# Patient Record
Sex: FEMALE | Race: WHITE | NOT HISPANIC OR LATINO | Employment: FULL TIME | URBAN - METROPOLITAN AREA
[De-identification: names, ages, dates, MRNs, and addresses within clinical notes are randomized per-mention and may not be internally consistent; named-entity substitution may affect disease eponyms.]

---

## 2022-12-14 ENCOUNTER — PROCEDURE VISIT (OUTPATIENT)
Dept: NEUROLOGY | Facility: CLINIC | Age: 52
End: 2022-12-14

## 2022-12-14 VITALS — DIASTOLIC BLOOD PRESSURE: 78 MMHG | OXYGEN SATURATION: 97 % | SYSTOLIC BLOOD PRESSURE: 116 MMHG | HEART RATE: 89 BPM

## 2022-12-14 DIAGNOSIS — G43.719 INTRACTABLE CHRONIC MIGRAINE WITHOUT AURA AND WITHOUT STATUS MIGRAINOSUS: Primary | ICD-10-CM

## 2022-12-14 RX ORDER — TOPIRAMATE 50 MG/1
50 TABLET, FILM COATED ORAL 2 TIMES DAILY
COMMUNITY

## 2022-12-14 RX ORDER — QUETIAPINE FUMARATE 100 MG/1
100 TABLET, FILM COATED ORAL NIGHTLY
COMMUNITY

## 2022-12-14 RX ORDER — TRAMADOL HYDROCHLORIDE 50 MG/1
50 TABLET ORAL EVERY 8 HOURS PRN
COMMUNITY

## 2022-12-14 RX ORDER — RIZATRIPTAN BENZOATE 10 MG/1
10 TABLET ORAL
COMMUNITY
End: 2022-12-14 | Stop reason: SDUPTHER

## 2022-12-14 RX ORDER — ZOLPIDEM TARTRATE 10 MG/1
10 TABLET ORAL
COMMUNITY

## 2022-12-14 RX ORDER — RIZATRIPTAN BENZOATE 10 MG/1
10 TABLET ORAL AS NEEDED
Qty: 9 TABLET | Refills: 3 | Status: SHIPPED | OUTPATIENT
Start: 2022-12-14 | End: 2023-01-13

## 2022-12-14 RX ORDER — DIAZEPAM 2 MG/1
2 TABLET ORAL EVERY 8 HOURS
COMMUNITY

## 2022-12-14 RX ORDER — TEMAZEPAM 30 MG/1
30 CAPSULE ORAL NIGHTLY PRN
COMMUNITY

## 2022-12-14 NOTE — PROGRESS NOTES
Procedure   Procedures      Patient returns for Botox injections for chronic migraine.  The risks and benefits of the injection procedure and the toxin were discussed with her in detail and she expressed understanding and agreement to proceed.    Description: Using a 30-gauge needle, a total of 165 units were injected.  The first 155 units were injected over 31 sites according to the chronic migraine injection paradigm.  This included symmetric injections into trapezius, cervical paraspinals, occipitalis, temporalis, procerus, , and frontalis.  Additionally, 5 units were injected into each upper occipitalis.  There was trace blood loss but no immediate complications.  The patient tolerated the procedure well.  There were 35 units of unavoidable waste.                    Botox B&B

## 2023-03-03 ENCOUNTER — TELEPHONE (OUTPATIENT)
Dept: NEUROLOGY | Facility: CLINIC | Age: 53
End: 2023-03-03
Payer: COMMERCIAL

## 2023-03-03 NOTE — TELEPHONE ENCOUNTER
Provider: ASTER PHELPS MD    Caller: April    Relationship to Patient: SELF    Phone Number: [phone]    Reason for Call: PT CALLING TO SEE IF SHE CAN R/S HER BOTOX ON 3-15-23 TO 3-16-23.    PLEASE CALL & ADVISE

## 2023-03-16 ENCOUNTER — PROCEDURE VISIT (OUTPATIENT)
Dept: NEUROLOGY | Facility: CLINIC | Age: 53
End: 2023-03-16
Payer: COMMERCIAL

## 2023-03-16 DIAGNOSIS — G43.719 INTRACTABLE CHRONIC MIGRAINE WITHOUT AURA AND WITHOUT STATUS MIGRAINOSUS: Primary | ICD-10-CM

## 2023-03-16 PROCEDURE — 64615 CHEMODENERV MUSC MIGRAINE: CPT | Performed by: PSYCHIATRY & NEUROLOGY

## 2023-03-16 RX ORDER — DIVALPROEX SODIUM 125 MG/1
1 TABLET, DELAYED RELEASE ORAL EVERY 12 HOURS SCHEDULED
COMMUNITY
Start: 2023-03-13

## 2023-09-22 ENCOUNTER — SPECIALTY PHARMACY (OUTPATIENT)
Dept: INFUSION THERAPY | Facility: HOSPITAL | Age: 53
End: 2023-09-22

## 2023-09-25 RX ORDER — ONABOTULINUMTOXINA 200 [USP'U]/1
165 INJECTION, POWDER, LYOPHILIZED, FOR SOLUTION INTRADERMAL; INTRAMUSCULAR
Qty: 1 EACH | Refills: 2 | Status: SHIPPED | OUTPATIENT
Start: 2023-09-25 | End: 2023-12-18 | Stop reason: SDUPTHER

## 2023-09-27 ENCOUNTER — SPECIALTY PHARMACY (OUTPATIENT)
Dept: NEUROLOGY | Facility: CLINIC | Age: 53
End: 2023-09-27
Payer: COMMERCIAL

## 2023-09-27 ENCOUNTER — PROCEDURE VISIT (OUTPATIENT)
Dept: NEUROLOGY | Facility: CLINIC | Age: 53
End: 2023-09-27
Payer: COMMERCIAL

## 2023-09-27 VITALS
SYSTOLIC BLOOD PRESSURE: 110 MMHG | HEIGHT: 62 IN | DIASTOLIC BLOOD PRESSURE: 70 MMHG | HEART RATE: 67 BPM | OXYGEN SATURATION: 97 %

## 2023-09-27 DIAGNOSIS — G43.719 INTRACTABLE CHRONIC MIGRAINE WITHOUT AURA AND WITHOUT STATUS MIGRAINOSUS: Primary | ICD-10-CM

## 2023-09-27 NOTE — PROGRESS NOTES
Procedure   Procedures       Patient returns for Botox injections for chronic migraine.  The risks and benefits of the injection procedure and the toxin were discussed with her in detail and she expressed understanding and agreement to proceed.     Description: Using a 30-gauge needle, a total of 165 units were injected.  The first 155 units were injected over 31 sites according to the chronic migraine injection paradigm.  This included symmetric injections into trapezius, cervical paraspinals, occipitalis, temporalis, procerus, , and frontalis.  Additionally, 5 units were injected into each upper occipitalis.  There was trace blood loss but no immediate complications.  The patient tolerated the procedure well.  There were 35 units of unavoidable waste.     Per her request, we are changing her Nurtec over to Ubrelvy for acute therapy, 50 mg as needed.  Nurtec appears to have lost its effectiveness for her.              Botox supplied by [facility]- Do not bill

## 2023-09-27 NOTE — PROGRESS NOTES
Specialty Pharmacy Patient Management Program  Neurology Initial Assessment     April Harmon is a 52 y.o. female with chronic migraine seen by a Neurology provider and enrolled in the Neurology Patient Management program offered by [facility].  An initial outreach was conducted, including assessment of therapy appropriateness and specialty medication education for Botox. The patient was introduced to services offered by [facility], including: regular assessments, refill coordination, curbside pick-up or mail order delivery options, prior authorization maintenance, and financial assistance programs as applicable. The patient was also provided with contact information for the pharmacy team.     Insurance Coverage & Financial Support  Rx [employer] and savings card.    Relevant Past Medical History and Comorbidities  Relevant medical history and concomitant health conditions were discussed with the patient. The patient's chart has been reviewed for relevant past medical history and comorbid health conditions and updated as necessary.   No past medical history on file.  Social History     Socioeconomic History    Marital status: Single     Problem list reviewed by Real Blackmon RPH on 9/27/2023 at 11:54 AM    Allergies  Known allergies and reactions were discussed with the patient. The patient's chart has been reviewed for  allergy information and updated as necessary.   Allergies   Allergen Reactions    Sulfa Antibiotics Hives and Rash     Allergies reviewed by Real Blackmon RPH on 9/27/2023 at 11:54 AM        Current Medication List  This medication list has been reviewed with the patient and evaluated for any interactions or necessary modifications/recommendations, and updated to include all prescription medications, OTC medications, and supplements the patient is currently taking.  This list reflects what is contained in the patient's profile,  which has also been marked as reviewed to communicate to other providers it is the most up to date version of the patient's current medication therapy.     Current Outpatient Medications:     diazePAM (VALIUM) 2 MG tablet, Take 1 tablet by mouth Every 8 (Eight) Hours., Disp: , Rfl:     divalproex (DEPAKOTE) 125 MG DR tablet, Take 1 tablet by mouth Every 12 (Twelve) Hours., Disp: , Rfl:     OnabotulinumtoxinA (Botox) 200 units reconstituted solution, Inject 165 Units into the appropriate muscle as directed by prescriber Every 3 (Three) Months., Disp: 1 each, Rfl: 2    QUEtiapine (SEROquel) 100 MG tablet, Take 1 tablet by mouth Every Night., Disp: , Rfl:     temazepam (RESTORIL) 30 MG capsule, Take 1 capsule by mouth At Night As Needed for Sleep., Disp: , Rfl:     topiramate (TOPAMAX) 50 MG tablet, Take 1 tablet by mouth 2 (Two) Times a Day., Disp: , Rfl:     traMADol (ULTRAM) 50 MG tablet, Take 1 tablet by mouth Every 8 (Eight) Hours As Needed., Disp: , Rfl:     ubrogepant (UBRELVY) 50 MG tablet, Take 1 tablet by mouth 1 (One) Time As Needed (Migraine). May repeat 1 tablet after 2 hours if needed. Max of 2 tablets in 24 hours., Disp: 10 tablet, Rfl: 11  No current facility-administered medications for this visit.    Medicines reviewed by Real Blackmon RP on 9/27/2023 at 11:54 AM    Drug Interactions  None identified.     Initial Education Provided for Specialty Medication  The patient has been provided with the following education and any applicable administration techniques (i.e. self-injection) have been demonstrated for the therapies indicated. All questions and concerns have been addressed prior to the patient receiving the medication, and the patient has verbalized understanding of the education and any materials provided.  Additional patient education shall be provided and documented upon request by the patient, provider or payer.          Botox (onabotulinumtoxinA) 165 units given in 33 equally divided  doses by neurologist intramuscular in clinic office.      Medication Expectations   Why am I taking this medication? For prevention and relief of migraines.   What should I expect while on this medication? You should expect to see a decrease in the severity and frequency of migraines..     How does the medication work? Botox enters the nerve endings around where it is injected and blocks the release of chemicals involved in pain transmission.  This prevents activation of pain networks in the brain.   How long will I be on this medication for? The amount of time you will be on this medication will be determined by your doctor based your response.    How do I take this medication? This medication will be given in the office.  It will be given IM into each of 31 sites divided across 7 specific head/neck muscle areas.     What are some possible side effects? Potential side effects including, but not limited to: neck pain and stiffness, neck weakness,  temporary drooping of the eye, rare flu-like symptoms (such as muscle aches and fever), dry eyes, injections site pain, bleeding, infection, failure of the procedure to help.  More serious side effect such as allergic reaction, dysphagia, respiratory distress, .  Discussed that it may take 10-14 days after first treatment to see improvement of headaches, and that the in-office treatments are done every 12 weeks.  I gave the patient my name and contact information for any additional questions or concerns.       What happens if I miss a dose? N/A                Medication Safety   What are things I should warn my doctor immediately about? Let the provider know immediately if you have difficulty breathing or swallowing, weakness of neck muscles.   What are things that I should be cautious of?  Injections near the eye: This medicine may reduce blinking, which can raise the risk of eye problems, including corneal exposure and ulcers. Tell your doctor right away if you notice  that you are blinking less than usual or your eyes feel dry   What are some medications that can interact with this one? N/A          Medication Storage/Handling   How should I handle this medication? N/A   How does this medication need to be stored? N/A   How should I dispose of this medication? N/A          Resources/Support   How can I remind myself to take this medication? Patient will be scheduled every 3 months in clinic.   Is financial support available?  [employer] Botox savings program.   Which vaccines are recommended for me? Talk to your doctor about these vaccines: Flu, Coronavirus (COVID-19), Pneumococcal (pneumonia), Tdap, Hepatitis B, Zoster (shingles)               Adherence and Self-Administration  Adherence related to the patient's specialty therapy was discussed with the patient. The Adherence segment of this outreach has been reviewed and updated.   Is there a concern with patient's ability to self administer the medication correctly and without issue?: No  Were any potential barriers to adherence identified during the initial assessment or patient education?: No  Are there any concerns regarding the patient's understanding of the importance of medication adherence?: No  Methods for Supporting Patient Adherence and/or Self-Administration: None needed at this time.    Goals of Therapy  Goals related to the patient's specialty therapy were discussed with the patient. The Patient Goals segment of this outreach has been reviewed and updated.   Goals Addressed Today        Specialty Pharmacy General Goal      Reduce/maintain reduced frequency and severity of migraines. Patient continuing Botox as of 9/27/23 and reports occasional breakthrough migraines about ~2-3 times per month with severity of ~7/10 on a pain scale from 0-10. But this is highly variable based on weather and other external factors.                Reassessment Plan & Follow-Up  Medication Therapy Changes: continuing Botox for prevention  and starting ubrogepant (Ubrelvy) as needed for acute treatment of migraines.   Related Plans, Therapy Recommendations, or Therapy Problems to Be Addressed: Ubrogepant being filled with external pharmacy due to restrictions with patient's insurance.  Pharmacist to perform regular reassessments no more than (6) months from the previous assessment.  Care Coordinator to set up future refill outreaches, coordinate prescription delivery, and escalate clinical questions to pharmacist.   Welcome information and patient satisfaction survey to be sent by specialty pharmacy team with patient's initial fill.    Attestation  Therapeutic appropriateness: Appropriate   I attest the patient was actively involved in and has agreed to the above plan of care. If the prescribed therapy is at any point deemed not appropriate based on the current or future assessments, a consultation will be initiated with the patient's specialty care provider to determine the best course of action. The revised plan of therapy will be documented along with any additional patient education provided. Discussed aforementioned material with patient via telemedicine.    Real Blackmon, PharmD, [facility]  Clinic Specialty Pharmacist, Neurology  9/27/2023  11:57 EDT

## 2023-10-26 ENCOUNTER — TELEPHONE (OUTPATIENT)
Dept: NEUROLOGY | Facility: CLINIC | Age: 53
End: 2023-10-26

## 2023-10-26 NOTE — TELEPHONE ENCOUNTER
Caller: Tyron April    Relationship: Self    Best call back number: [phone]    Which medication are you concerned about: UBRELVY 100 MG    Who prescribed you this medication: MATTIE    When did you start taking this medication: 09/27/23    What are your concerns: PATIENT STATES THE UBRELVY IS NOT HELPING HER MIGRAINE. PATIENT IS REQUEST AN ALTERNATIVE. PLEASE CONTACT PATIENT.    How long have you had these concerns: 09/27/23        PLEASE REVIEW    THANK YOU

## 2023-11-06 ENCOUNTER — SPECIALTY PHARMACY (OUTPATIENT)
Dept: INFUSION THERAPY | Facility: HOSPITAL | Age: 53
End: 2023-11-06
Payer: COMMERCIAL

## 2023-11-06 NOTE — PROGRESS NOTES
Per Dr. Ureña, please increase Ubrelvy dose to 100mg as needed. I entered a new order for Ubrelvy 100mg #16 with 5 refills and [facility].   Ki Flores, [facility]  11/06/23  09:04 EST

## 2023-12-11 ENCOUNTER — TELEPHONE (OUTPATIENT)
Dept: NEUROLOGY | Facility: CLINIC | Age: 53
End: 2023-12-11

## 2023-12-11 NOTE — TELEPHONE ENCOUNTER
Caller: Ackerman, April    Relationship: Self    Best call back number: [phone]    What was the call regarding: PT IS WONDERING IF DR. PHELPS HAS ANY SOONER BOTOX APPTS. PT'S LAST BOTOX APPT WAS COMPLETED ON 9/27/23; 12 WEEKS WOULD BE 12/20/23, HOWEVER, NEXT BOTOX APPT IS SCHEDULED FOR 1/10/24.    Do you require a callback: YES, PLEASE.    PLEASE REVIEW AND ADVISE.

## 2023-12-18 ENCOUNTER — SPECIALTY PHARMACY (OUTPATIENT)
Dept: INFUSION THERAPY | Facility: HOSPITAL | Age: 53
End: 2023-12-18
Payer: COMMERCIAL

## 2023-12-18 RX ORDER — ONABOTULINUMTOXINA 200 [USP'U]/1
155 INJECTION, POWDER, LYOPHILIZED, FOR SOLUTION INTRADERMAL; INTRAMUSCULAR
Qty: 1 EACH | Refills: 2 | Status: SHIPPED | OUTPATIENT
Start: 2023-12-18

## 2023-12-18 NOTE — PROGRESS NOTES
Specialty Pharmacy Refill Coordination Note     Confirmed patient's botox appt for 12/20/23- went to order medication and patient's insurance only allows 1 outside fill, which was used on initial fill and now fills must go through John Kinney  Specialty Pharmacy Technician

## 2023-12-20 ENCOUNTER — SPECIALTY PHARMACY (OUTPATIENT)
Dept: INFUSION THERAPY | Facility: HOSPITAL | Age: 53
End: 2023-12-20
Payer: COMMERCIAL

## 2023-12-20 ENCOUNTER — PROCEDURE VISIT (OUTPATIENT)
Dept: NEUROLOGY | Facility: CLINIC | Age: 53
End: 2023-12-20
Payer: COMMERCIAL

## 2023-12-20 VITALS — HEART RATE: 60 BPM | DIASTOLIC BLOOD PRESSURE: 76 MMHG | OXYGEN SATURATION: 98 % | SYSTOLIC BLOOD PRESSURE: 124 MMHG

## 2023-12-20 DIAGNOSIS — G43.719 INTRACTABLE CHRONIC MIGRAINE WITHOUT AURA AND WITHOUT STATUS MIGRAINOSUS: Primary | ICD-10-CM

## 2023-12-20 RX ORDER — ZAVEGEPANT 10 MG/.1ML
10 SPRAY NASAL ONCE AS NEEDED
Qty: 6 EACH | Refills: 5 | Status: SHIPPED | OUTPATIENT
Start: 2023-12-20

## 2023-12-20 NOTE — PROGRESS NOTES
Specialty Pharmacy Refill Coordination Note     Ubrelvy 100mg PA renewal- submitted 12/20/23 key [id number]      Caro Kinney  Specialty Pharmacy Technician

## 2023-12-20 NOTE — PROGRESS NOTES
Specialty Pharmacy Refill Coordination Note     BOTOX INJ 200UNIT is approved through 03/20/2024     Caro Kinney  Specialty Pharmacy Technician

## 2023-12-20 NOTE — PROGRESS NOTES
Specialty Pharmacy Refill Coordination Note     Botox PA rnewal submitted 12/20/23 key [id number]     Current exp 12/25- last appt was today     Caro Kniney  Specialty Pharmacy Technician

## 2023-12-20 NOTE — PROGRESS NOTES
Specialty Pharmacy Patient Management Program  Neurology Initial Assessment     April Harmon is a 53 y.o. female with migraines seen by a Neurology provider and enrolled in the Neurology Patient Management program offered by [facility].  An initial outreach was conducted, including assessment of therapy appropriateness and specialty medication education for Zavzpret 10mg. The patient was introduced to services offered by [facility], including: regular assessments, refill coordination, curbside pick-up or mail order delivery options, prior authorization maintenance, and financial assistance programs as applicable. The patient was also provided with contact information for the pharmacy team.     Insurance Coverage & Financial Support  [employer]    Relevant Past Medical History and Comorbidities  Relevant medical history and concomitant health conditions were discussed with the patient. The patient's chart has been reviewed for relevant past medical history and comorbid health conditions and updated as necessary.   No past medical history on file.  Social History     Socioeconomic History    Marital status: Single     Problem list reviewed by Ki Flores RPH on 12/20/2023 at 10:21 AM    Allergies  Known allergies and reactions were discussed with the patient. The patient's chart has been reviewed for  allergy information and updated as necessary.   Allergies   Allergen Reactions    Sulfa Antibiotics Hives and Rash     Allergies reviewed by Ki Flores RPH on 12/20/2023 at 10:21 AM    Relevant Laboratory Values      Lab Assessment  There are no laboratory values in Epic to assess. Patient was encouraged to have labs drawn at least once per year.     Current Medication List  This medication list has been reviewed with the patient and evaluated for any interactions or necessary modifications/recommendations, and updated to include all  prescription medications, OTC medications, and supplements the patient is currently taking.  This list reflects what is contained in the patient's profile, which has also been marked as reviewed to communicate to other providers it is the most up to date version of the patient's current medication therapy.     Current Outpatient Medications:     diazePAM (VALIUM) 2 MG tablet, Take 1 tablet by mouth Every 8 (Eight) Hours., Disp: , Rfl:     divalproex (DEPAKOTE) 125 MG DR tablet, Take 1 tablet by mouth Every 12 (Twelve) Hours., Disp: , Rfl:     OnabotulinumtoxinA (Botox) 200 units reconstituted solution, Inject 155 Units into the appropriate muscle as directed by prescriber Every 3 (Three) Months., Disp: 1 each, Rfl: 2    QUEtiapine (SEROquel) 100 MG tablet, Take 1 tablet by mouth Every Night., Disp: , Rfl:     temazepam (RESTORIL) 30 MG capsule, Take 1 capsule by mouth At Night As Needed for Sleep., Disp: , Rfl:     topiramate (TOPAMAX) 50 MG tablet, Take 1 tablet by mouth 2 (Two) Times a Day., Disp: , Rfl:     traMADol (ULTRAM) 50 MG tablet, Take 1 tablet by mouth Every 8 (Eight) Hours As Needed., Disp: , Rfl:     Zavegepant HCl (Zavzpret) 10 MG/ACT solution, 10 mg into the nostril(s) as directed by provider 1 (One) Time As Needed (migraine). Do not exceed one dose every 24 hours., Disp: 6 each, Rfl: 5  No current facility-administered medications for this visit.    Medicines reviewed by Ki Flores [facility] on 12/20/2023 at 10:21 AM  Medicines reviewed by Ki Flores RP on 12/20/2023 at 10:21 AM    Drug Interactions  The patient's medication list has been reviewed and updated. There are no significant interactions to assess. Patient was counseled to avoid using another nasal spray within an hour of administering Zavzpret.      Initial Education Provided for Specialty Medication  The patient has been provided with the following education and any applicable administration techniques  (i.e. self-injection) have been demonstrated for the therapies indicated. All questions and concerns have been addressed prior to the patient receiving the medication, and the patient has verbalized understanding of the education and any materials provided.  Additional patient education shall be provided and documented upon request by the patient, provider or payer.      Zavzpret (zavegepant)  Medication Expectations   Why am I taking this medication? You are taking this medication to treat an acute migraine.   What should I expect while on this medication? You should expect to see a decrease in the frequency and severity of your migraines.   How does the medication work? Zavzpret is a monoclonal antibody that binds to calcitonin gene-related peptide (CGRP) and blocks its binding to the receptor decreasing the severity of migraines.   How long will I be on this medication for? The amount of time you will be on this medication will be determined by your doctor and your response to the medication.    How do I take this medication? Take as directed on your prescription label.   What are some possible side effects? Potential side effects including, but not limited to taste disorders, nausea, nasal discomfort, and vomiting. Pt verbalized understanding.   What happens if I miss a dose? Not applicable     Medication Safety   What are things I should warn my doctor immediately about? Hypersensitivity reactions - trouble breathing or swallowing.   What are things that I should be cautious of? Hypersensitivity reactions (eg, dyspnea, rash), including delayed serious reactions, have occurred; discontinue use if suspected    What are some medications that can interact with this one? Avoid concomitant administration of Zavzpret with drugs that induce or inhibit URRA8P2 or NTCP transporters. Avoid use of intranasal decongestants; if unavoidable, administer intranasal decongestants at least 1 hour after ZAVZPRET administration.  Ask your pharmacist or health care provider before starting new medications     Medication Storage/Handling   How should I handle this medication? Keep this medication out of reach of pets/children in original container. Keep device in sealed blister pack until ready to use; each device is single use and contains only 1 dose. Do not test spray, prime, or press the plunger before use. While sitting or standing, gently blow nose to clear nostrils. Hold device upright with thumb on bottom of the plunger and 2 fingers on either side of the nozzle. With other hand, close 1 nostril gently. While keeping head level and upright, and mouth closed, insert nozzle into open nostril as far as comfortable. Instruct patient to slowly breathe in through nose and press plunger firmly with thumb to release spray; retain nozzle in nose during dose administration; do not remove nozzle while pressing plunger. Do not tilt head or lay down while delivering dose. Remove nozzle from nostril; keep head level for 10 to 20 seconds and gently breathe in through nose and out through mouth. If nose starts dripping, gently sniff to avoid losing any of the dose.   How does this medication need to be stored? Store at room temperature away from heat/cold, sunlight or moisture.   How should I dispose of this medication? There should not be a need to dispose of this medication unless your provider decides to change the dose or therapy. If that is the case, take to your local police station for proper disposal. Some pharmacies also have take-back bins for medication drop-off.      Resources/Support   How can I remind myself to take this medication? N/A - abortive medication.   Is financial support available?  Yes, [employer] can provide co-pay cards if you have commercial insurance or patient assistance if you have Medicare or no insurance.    Which vaccines are recommended for me? Talk to your doctor about these vaccines: Flu, Coronavirus (COVID-19),  Pneumococcal (pneumonia), Tdap, Hepatitis B, Zoster (shingles)       Enter Specific Medication SmartPhrase Here    Adherence and Self-Administration  Adherence related to the patient's specialty therapy was discussed with the patient. The Adherence segment of this outreach has been reviewed and updated.   Is there a concern with patient's ability to self administer the medication correctly and without issue?: No  Were any potential barriers to adherence identified during the initial assessment or patient education?: No  Are there any concerns regarding the patient's understanding of the importance of medication adherence?: No  Methods for Supporting Patient Adherence and/or Self-Administration: Patient was shown how to properly administer her PRN dose of Zavzpret using a training device. Patient is aware of when to use her Zavzpret.     Goals of Therapy  Goals related to the patient's specialty therapy were discussed with the patient. The Patient Goals segment of this outreach has been reviewed and updated.   Goals Addressed Today        Specialty Pharmacy General Goal      Reduce/maintain reduced frequency and severity of migraines. Patient continuing Botox as of 9/27/23 and reports occasional breakthrough migraines about ~2-3 times per month with severity of ~7/10 on a pain scale from 0-10. But this is highly variable based on weather and other external factors.                Reassessment Plan & Follow-Up  Medication Therapy Changes: Patient reports that her Botox is still providing good control of her migraine frequency, however, she does notice an up tick in migraines two weeks before her next scheduled Botox injections. Her Ubrelvy has stopped providing symptom relief even after an increase to 100mg per dose. Per arianne Donahue to switch patient to Zavzpret 10mg as needed for migraines.   Related Plans, Therapy Recommendations, or Therapy Problems to Be Addressed: Will continue to assess patient's  response to abortive therapy. Patient has a history of failing triptans, Nurtec and Ubrelvy.   Pharmacist to perform regular reassessments no more than (6) months from the previous assessment.  Care Coordinator to set up future refill outreaches, coordinate prescription delivery, and escalate clinical questions to pharmacist.   Welcome information and patient satisfaction survey to be sent by specialty pharmacy team with patient's initial fill.    Attestation  Therapeutic appropriateness: Appropriate   I attest the patient was actively involved in and has agreed to the above plan of care. If the prescribed therapy is at any point deemed not appropriate based on the current or future assessments, a consultation will be initiated with the patient's specialty care provider to determine the best course of action. The revised plan of therapy will be documented along with any additional patient education provided. Discussed aforementioned material with patient in person.    Jason Flores, PharmD  Clinic Specialty Pharmacist, Neurology  12/20/2023  10:22 EST

## 2024-01-05 ENCOUNTER — SPECIALTY PHARMACY (OUTPATIENT)
Dept: INFUSION THERAPY | Facility: HOSPITAL | Age: 54
End: 2024-01-05
Payer: COMMERCIAL

## 2024-01-19 ENCOUNTER — SPECIALTY PHARMACY (OUTPATIENT)
Dept: INFUSION THERAPY | Facility: HOSPITAL | Age: 54
End: 2024-01-19
Payer: COMMERCIAL

## 2024-01-19 NOTE — PROGRESS NOTES
Specialty Pharmacy Refill Coordination Note     April is a 53 y.o. female contacted today regarding refills of  1 specialty medication(s).    Reviewed and verified with patient:       Specialty medication(s) and dose(s) confirmed: yes    Refill Questions      Flowsheet Row Most Recent Value   Changes to allergies? No   Changes to medications? No   New conditions or infections since last clinic visit No   Unplanned office visit, urgent care, ED, or hospital admission in the last 4 weeks  No   How does patient/caregiver feel medication is working? Very good   Financial problems or insurance changes  No   Since the previous refill, were any specialty medication doses or scheduled injections missed or delayed?  No   Does this patient require a clinical escalation to a pharmacist? No            Delivery Questions      Flowsheet Row Most Recent Value   Delivery method  at Pharmacy   Delivery address verified with patient/caregiver? Yes   Delivery address Other (Comment)  [Pickup]   Number of medications in delivery 1   Medication(s) being filled and delivered Zavegepant Hcl   Doses left of specialty medications 2   Copay verified? Yes   Copay amount $0   Copay form of payment No copayment ($0)                   Follow-up: 25 day(s)     Caro Kinney  Specialty Pharmacy Technician

## 2024-02-21 ENCOUNTER — SPECIALTY PHARMACY (OUTPATIENT)
Dept: INFUSION THERAPY | Facility: HOSPITAL | Age: 54
End: 2024-02-21
Payer: COMMERCIAL

## 2024-02-21 NOTE — PROGRESS NOTES
Specialty Pharmacy Refill Coordination Note     Updated zavzpret pa date     Caro Kinney  Specialty Pharmacy Technician

## 2024-02-28 ENCOUNTER — SPECIALTY PHARMACY (OUTPATIENT)
Dept: INFUSION THERAPY | Facility: HOSPITAL | Age: 54
End: 2024-02-28
Payer: COMMERCIAL

## 2024-02-28 NOTE — PROGRESS NOTES
Specialty Pharmacy Refill Coordination Note     April is a 53 y.o. female contacted today regarding refills of  1 specialty medication(s).    Reviewed and verified with patient:       Specialty medication(s) and dose(s) confirmed: yes    Refill Questions      Flowsheet Row Most Recent Value   Changes to allergies? No   Changes to medications? No   New conditions or infections since last clinic visit No   Unplanned office visit, urgent care, ED, or hospital admission in the last 4 weeks  No   How does patient/caregiver feel medication is working? Very good   Financial problems or insurance changes  No   Since the previous refill, were any specialty medication doses or scheduled injections missed or delayed?  No   Does this patient require a clinical escalation to a pharmacist? No            Delivery Questions      Flowsheet Row Most Recent Value   Delivery method  at Pharmacy   Delivery address verified with patient/caregiver? Yes   Delivery address --  []   Number of medications in delivery 1   Medication(s) being filled and delivered Zavegepant Hcl   Doses left of specialty medications 1   Copay verified? Yes   Copay amount SPRX: $0 last fill and consent obtained for potential copay differences   Copay form of payment No copayment ($0)                   Follow-up: 25 day(s)     Caro Kinney  Specialty Pharmacy Technician

## 2024-03-06 ENCOUNTER — SPECIALTY PHARMACY (OUTPATIENT)
Dept: INFUSION THERAPY | Facility: HOSPITAL | Age: 54
End: 2024-03-06
Payer: COMMERCIAL

## 2024-03-06 NOTE — PROGRESS NOTES
Specialty Pharmacy Refill Coordination Note     April is a 53 y.o. female contacted today regarding refills of her specialty medication(s).    Specialty medication(s) and dose(s) confirmed: Botox 200 unit vial  Changes to medications: no  Changes to insurance: no  Reviewed and verified with patient:         Refill Questions      Flowsheet Row Most Recent Value   Changes to allergies? No   Changes to medications? No   New conditions or infections since last clinic visit No   Unplanned office visit, urgent care, ED, or hospital admission in the last 4 weeks  No   How does patient/caregiver feel medication is working? Very good   Financial problems or insurance changes  No   Since the previous refill, were any specialty medication doses or scheduled injections missed or delayed?  No   Does this patient require a clinical escalation to a pharmacist? No            Delivery Questions      Flowsheet Row Most Recent Value   Delivery method Other (Comment)   Delivery address verified with patient/caregiver? Yes   Delivery address Other (Comment)   Number of medications in delivery 1   Medication(s) being filled and delivered --  [Botox]   Doses left of specialty medications 0   Copay verified? Yes   Copay amount 0   Copay form of payment No copayment ($0)                 Follow-up: 12 week(s)     Ki Flores [facility]  3/6/2024   11:16 EST

## 2024-03-14 ENCOUNTER — SPECIALTY PHARMACY (OUTPATIENT)
Dept: INFUSION THERAPY | Facility: HOSPITAL | Age: 54
End: 2024-03-14
Payer: COMMERCIAL

## 2024-03-14 NOTE — PROGRESS NOTES
Specialty Pharmacy Refill Coordination Note     Zavzpret PA renewal submitted 3/14/24 key [id number]     Caro Kinney  Specialty Pharmacy Technician

## 2024-03-22 ENCOUNTER — SPECIALTY PHARMACY (OUTPATIENT)
Dept: INFUSION THERAPY | Facility: HOSPITAL | Age: 54
End: 2024-03-22
Payer: COMMERCIAL

## 2024-03-22 ENCOUNTER — PROCEDURE VISIT (OUTPATIENT)
Dept: NEUROLOGY | Facility: CLINIC | Age: 54
End: 2024-03-22
Payer: COMMERCIAL

## 2024-03-22 VITALS — HEART RATE: 75 BPM | SYSTOLIC BLOOD PRESSURE: 102 MMHG | OXYGEN SATURATION: 97 % | DIASTOLIC BLOOD PRESSURE: 80 MMHG

## 2024-03-22 DIAGNOSIS — G43.719 INTRACTABLE CHRONIC MIGRAINE WITHOUT AURA AND WITHOUT STATUS MIGRAINOSUS: Primary | ICD-10-CM

## 2024-03-22 NOTE — PROGRESS NOTES
Procedure   Procedures    Patient returns for Botox injections for chronic migraine.  The risks and benefits of the injection procedure and the toxin were discussed with her in detail and she expressed understanding and agreement to proceed.     Description: Using a 30-gauge needle, a total of 165 units were injected.  The first 155 units were injected over 31 sites according to the chronic migraine injection paradigm.  This included symmetric injections into trapezius, cervical paraspinals, occipitalis, temporalis, procerus, , and frontalis.  Additionally, 5 units were injected into each upper occipitalis.  There was trace blood loss but no immediate complications.  The patient tolerated the procedure well.  There were 35 units of unavoidable waste.    Although the Botox is still providing reasonable benefit for her, she is still having at least 10 headache days per month, often at the end of the 3-month cycle when the Botox is wearing off.  She is interested in adding another preventive medication as she is running out of acute medicines during the month.  I think the optimal time to consider that would be 2 months from now and we will see her back at that point and consider adding additional prophylaxis such as Emgality.                Botox provided by [facility]: Do Not Bill Pt.

## 2024-03-25 ENCOUNTER — SPECIALTY PHARMACY (OUTPATIENT)
Dept: INFUSION THERAPY | Facility: HOSPITAL | Age: 54
End: 2024-03-25
Payer: COMMERCIAL

## 2024-03-26 ENCOUNTER — SPECIALTY PHARMACY (OUTPATIENT)
Dept: INFUSION THERAPY | Facility: HOSPITAL | Age: 54
End: 2024-03-26
Payer: COMMERCIAL

## 2024-03-26 NOTE — PROGRESS NOTES
Specialty Pharmacy Refill Coordination Note     April is a 53 y.o. female contacted today regarding refills of  1 specialty medication(s).    Reviewed and verified with patient:       Specialty medication(s) and dose(s) confirmed: yes    Refill Questions      Flowsheet Row Most Recent Value   Changes to allergies? No   Changes to medications? No   New conditions or infections since last clinic visit No   Unplanned office visit, urgent care, ED, or hospital admission in the last 4 weeks  No   How does patient/caregiver feel medication is working? Very good   Financial problems or insurance changes  No   Since the previous refill, were any specialty medication doses or scheduled injections missed or delayed?  No   Does this patient require a clinical escalation to a pharmacist? No            Delivery Questions      Flowsheet Row Most Recent Value   Delivery method  at Pharmacy   Delivery address verified with patient/caregiver? Yes   Delivery address Other (Comment)  []   Number of medications in delivery 1   Medication(s) being filled and delivered Zavegepant Hcl   Doses left of specialty medications 2   Copay verified? Yes   Copay amount $0   Copay form of payment No copayment ($0)                   Follow-up: 25 day(s)     Caro Kinney  Specialty Pharmacy Technician

## 2024-03-29 ENCOUNTER — SPECIALTY PHARMACY (OUTPATIENT)
Dept: INFUSION THERAPY | Facility: HOSPITAL | Age: 54
End: 2024-03-29
Payer: COMMERCIAL

## 2024-03-29 NOTE — PROGRESS NOTES
Called [facility] PA Dept and submitted an appeal for Botox. Spoke w/ [employer] rep.  Case# [id number].  Ki Flores, [facility]  03/29/24  09:26 EDT

## 2024-04-01 ENCOUNTER — SPECIALTY PHARMACY (OUTPATIENT)
Dept: INFUSION THERAPY | Facility: HOSPITAL | Age: 54
End: 2024-04-01
Payer: COMMERCIAL

## 2024-04-30 ENCOUNTER — SPECIALTY PHARMACY (OUTPATIENT)
Dept: INFUSION THERAPY | Facility: HOSPITAL | Age: 54
End: 2024-04-30
Payer: COMMERCIAL

## 2024-04-30 NOTE — PROGRESS NOTES
Specialty Pharmacy Refill Coordination Note     April is a 53 y.o. female contacted today regarding refills of  1 specialty medication(s).    Reviewed and verified with patient:       Specialty medication(s) and dose(s) confirmed: yes    Refill Questions      Flowsheet Row Most Recent Value   Changes to allergies? No   Changes to medications? No   New conditions or infections since last clinic visit No   Unplanned office visit, urgent care, ED, or hospital admission in the last 4 weeks  No   How does patient/caregiver feel medication is working? Very good   Financial problems or insurance changes  No   Since the previous refill, were any specialty medication doses or scheduled injections missed or delayed?  No   Does this patient require a clinical escalation to a pharmacist? No            Delivery Questions      Flowsheet Row Most Recent Value   Delivery method  at Pharmacy   Delivery address verified with patient/caregiver? Yes   Delivery address Other (Comment)  []   Number of medications in delivery 1   Medication(s) being filled and delivered Zavegepant Hcl   Doses left of specialty medications 1   Copay verified? Yes   Copay amount $0   Copay form of payment No copayment ($0)                   Follow-up: 25 day(s)     Caro Kinney  Specialty Pharmacy Technician

## 2024-05-23 ENCOUNTER — SPECIALTY PHARMACY (OUTPATIENT)
Dept: INFUSION THERAPY | Facility: HOSPITAL | Age: 54
End: 2024-05-23
Payer: COMMERCIAL

## 2024-05-24 ENCOUNTER — OFFICE VISIT (OUTPATIENT)
Dept: NEUROLOGY | Facility: CLINIC | Age: 54
End: 2024-05-24
Payer: COMMERCIAL

## 2024-05-24 ENCOUNTER — SPECIALTY PHARMACY (OUTPATIENT)
Dept: INFUSION THERAPY | Facility: HOSPITAL | Age: 54
End: 2024-05-24
Payer: COMMERCIAL

## 2024-05-24 VITALS
OXYGEN SATURATION: 98 % | SYSTOLIC BLOOD PRESSURE: 140 MMHG | BODY MASS INDEX: 19.31 KG/M2 | WEIGHT: 105.6 LBS | HEART RATE: 78 BPM | DIASTOLIC BLOOD PRESSURE: 80 MMHG

## 2024-05-24 DIAGNOSIS — G43.711 INTRACTABLE CHRONIC MIGRAINE WITHOUT AURA AND WITH STATUS MIGRAINOSUS: Primary | ICD-10-CM

## 2024-05-24 PROCEDURE — 99214 OFFICE O/P EST MOD 30 MIN: CPT | Performed by: PSYCHIATRY & NEUROLOGY

## 2024-05-24 RX ORDER — LAMOTRIGINE 25 MG/1
50 TABLET ORAL DAILY
COMMUNITY

## 2024-05-24 RX ORDER — ATOMOXETINE 25 MG/1
25 CAPSULE ORAL DAILY
COMMUNITY

## 2024-05-24 RX ORDER — FREMANEZUMAB-VFRM 225 MG/1.5ML
225 INJECTION SUBCUTANEOUS
Qty: 1.5 ML | Refills: 5 | Status: SHIPPED | OUTPATIENT
Start: 2024-05-24

## 2024-05-24 NOTE — PROGRESS NOTES
Notes by MA:  Pt is here today to discuss her migraine treatment plan.      Subjective:     Patient ID: Allyson Ackerman is a 53 y.o. female.    History of Present Illness  The following portions of the patient's history were reviewed and updated as appropriate: allergies, current medications, past family history, past medical history, past social history, past surgical history, and problem list.    Review of Systems   Musculoskeletal:  Negative for gait problem.   Neurological:  Positive for headaches. Negative for dizziness, tremors, seizures, syncope, facial asymmetry, speech difficulty, weakness, light-headedness and numbness.   Psychiatric/Behavioral:  Negative for agitation, behavioral problems, confusion, decreased concentration, dysphoric mood, hallucinations, self-injury, sleep disturbance and suicidal ideas. The patient is not nervous/anxious and is not hyperactive.         Objective:    Neurologic Exam  Awake and alert with fluent speech, normal speech comprehension.    Cranial nerves II through XII normal and symmetric.    Motor exam reveals symmetric tone bulk and power with no drift.    Sensory exam reveals symmetric sensation to primary modalities.    Tendon reflexes are symmetric without pathologic features.    Toes are downgoing.  Gait is unremarkable.  No ataxia.  Physical Exam    Assessment/Plan:     Diagnoses and all orders for this visit:    1. Intractable chronic migraine without aura and with status migrainosus (Primary)     She has failed a host of medications at this point.  I am worried that there is a component of medication overuse headache here.  She has begun to fail botulinum toxin injections and is no longer responding to her topiramate.  I am tapering her off of topiramate.  We are discontinuing the botulinum toxin.  She has failed Emgality in the past although I am concerned she may have given up on a little bit too early.  However, we will start her on Ajovy injections today.  We  talked about potential side effects and I encouraged her to give this enough time to become effective over the next 3 to 4 months.  She still uses zavspret for acute therapy with reasonable success but does not get enough to manage her chronic migraines at the current time.

## 2024-05-24 NOTE — PROGRESS NOTES
Specialty Pharmacy Patient Management Program  Neurology Initial Assessment     April Harmon is a 53 y.o. female with migraines seen by a Neurology provider and enrolled in the Neurology Patient Management program offered by [facility].  An initial outreach was conducted, including assessment of therapy appropriateness and specialty medication education for Ajovy 225mg. The patient was introduced to services offered by [facility], including: regular assessments, refill coordination, curbside pick-up or mail order delivery options, prior authorization maintenance, and financial assistance programs as applicable. The patient was also provided with contact information for the pharmacy team.     Insurance Coverage & Financial Support  [facility]    Relevant Past Medical History and Comorbidities  Relevant medical history and concomitant health conditions were discussed with the patient. The patient's chart has been reviewed for relevant past medical history and comorbid health conditions and updated as necessary.   No past medical history on file.  Social History     Socioeconomic History    Marital status: Single     Problem list reviewed by Ki Flores RPH on 5/24/2024 at  8:54 AM    Allergies  Known allergies and reactions were discussed with the patient. The patient's chart has been reviewed for  allergy information and updated as necessary.   Allergies   Allergen Reactions    Sulfa Antibiotics Hives and Rash     Allergies reviewed by Ki Flores RPH on 5/24/2024 at  8:49 AM    Relevant Laboratory Values      Lab Assessment  There are no recent or up to date laboratory values in Epic to assess. There are no required ongoing laboratory monitoring parameters for this drug. The patient has been encouraged to obtain a baseline set of labs for the 2024 calendar year for their own health records.      Current Medication List  This  medication list has been reviewed with the patient and evaluated for any interactions or necessary modifications/recommendations, and updated to include all prescription medications, OTC medications, and supplements the patient is currently taking.  This list reflects what is contained in the patient's profile, which has also been marked as reviewed to communicate to other providers it is the most up to date version of the patient's current medication therapy.     Current Outpatient Medications:     atomoxetine (STRATTERA) 25 MG capsule, Take 1 capsule by mouth Daily., Disp: , Rfl:     diazePAM (VALIUM) 2 MG tablet, Take 1 tablet by mouth Every 8 (Eight) Hours., Disp: , Rfl:     divalproex (DEPAKOTE) 125 MG DR tablet, Take 1 tablet by mouth Every 12 (Twelve) Hours., Disp: , Rfl:     Fremanezumab-vfrm (Ajovy) 225 MG/1.5ML solution auto-injector, Inject 225 mg under the skin into the appropriate area as directed Every 30 (Thirty) Days., Disp: 1.5 mL, Rfl: 5    lamoTRIgine (LaMICtal) 25 MG tablet, Take 2 tablets by mouth Daily., Disp: , Rfl:     QUEtiapine (SEROquel) 100 MG tablet, Take 1 tablet by mouth Every Night., Disp: , Rfl:     temazepam (RESTORIL) 30 MG capsule, Take 1 capsule by mouth At Night As Needed for Sleep. (Patient not taking: Reported on 3/22/2024), Disp: , Rfl:     topiramate (TOPAMAX) 50 MG tablet, Take 1 tablet by mouth 2 (Two) Times a Day., Disp: , Rfl:     traMADol (ULTRAM) 50 MG tablet, Take 1 tablet by mouth Every 8 (Eight) Hours As Needed. (Patient not taking: Reported on 3/22/2024), Disp: , Rfl:     Zavegepant HCl (Zavzpret) 10 MG/ACT solution, instill 1 spray in 1 nostril as needed for migraine.  Do not exceed one dose every 24 hours., Disp: 6 each, Rfl: 5    Medicines reviewed by Ki Flores [facility] on 5/24/2024 at  8:53 AM    Drug Interactions  The patient's medication profile has been reviewed for accuracy and assessed for interactions. There are currently no significant  interactions to address at this time, however, the patient has been encouraged to notify our office if they plan to start any new medications or supplements.      Initial Education Provided for Specialty Medication  The patient has been provided with the following education and any applicable administration techniques (i.e. self-injection) have been demonstrated for the therapies indicated. All questions and concerns have been addressed prior to the patient receiving the medication, and the patient has verbalized understanding of the education and any materials provided.  Additional patient education shall be provided and documented upon request by the patient, provider or payer.      Ajovy (fremanezumab-vfrm) 225 mg subcutaneous every 30 days     Medication Expectations   Why am I taking this medication? You are taking this medication for migraine prophylaxis.   What should I expect while on this medication? You should expect to a decrease in the frequency and severity of your migraines.   How does the medication work? Ajovy is a monoclonal antibody that binds to calcitonin gene-related peptide (CGRP) and blocks its binding to the receptor decreasing the severity of migraines.   How long will I be on this medication for? The amount of time you will be on this medication will be determined by your doctor and your response to the medication.    How do I take this medication? Take as directed on your prescription label. This medication is a self-injection given every 30 days.    What are some possible side effects? Injection site reactions and hypersensitivity reactions.   What happens if I miss a dose? If you miss a dose, take it as soon as you remember, and time next dose 30 days from last dose.       Medication Safety   What are things I should warn my doctor immediately about? Cases of anaphylaxis and angioedema have been reported in the postmarketing setting. If a reaction occurs, notify your doctor immediately.    What are things that I should be cautious of? Injection site reaction and hypersensitivity reactions, including rash, pruritus, drug hypersensitivity, and urticaria   What are some medications that can interact with this one? No drug interactions identified.      Medication Storage/Handling   How should I handle this medication? Keep this medication our of reach of pets/children in original container.  On the day your Ajovy is due let it set at room temperature for 30 minutes prior to injection. (do NOT warm using a heat source such as hot water or a microwave).  Administer in the abdomen, thigh, back of the upper arm, or buttocks.  Do not inject where the skin is tender, bruised, red or hard.  Rotate injection sites.   How does this medication need to be stored? Store in refrigerator and keep dry.   How should I dispose of this medication? You can dispose of the empty syringe in a sharps container, and if this is not available you may use an empty hard plastic container such as a milk jug or tide container.      Resources/Support   How can I remind myself to take this medication? You can download a reminder zully on your phone or use a calandar  to help with your monthly injection.   Is financial support available?  Yes, [employer] can provide co-pay cards if you have commercial insurance or patient assistance if you have Medicare or no insurance.    Which vaccines are recommended for me? Talk to your doctor about these vaccines: Flu, Coronavirus (COVID-19), Pneumococcal (pneumonia), Tdap, Hepatitis B, Zoster (shingles)               Adherence and Self-Administration  Adherence related to the patient's specialty therapy was discussed with the patient. The Adherence segment of this outreach has been reviewed and updated.            Methods for Supporting Patient Adherence and/or Self-Administration: The patient was given her first dose today in office.  She is familiar with the subcutaneous injections and  was provided instructions on how to administer Ajovy.     Goals of Therapy  Goals related to the patient's specialty therapy were discussed with the patient. The Patient Goals segment of this outreach has been reviewed and updated.   Goals Addressed Today        Specialty Pharmacy General Goal      Reduce/maintain reduced frequency and severity of migraines. Patient continuing Botox as of 9/27/23 and reports occasional breakthrough migraines about ~2-3 times per month with severity of ~7/10 on a pain scale from 0-10. But this is highly variable based on weather and other external factors.   As of 5/24, Botox was deemed ineffective and we will move the patient to Ajovy monthly injections. Is still hoping to reduce migraines to no more than twice per week.                Reassessment Plan & Follow-Up  Medication Therapy Changes: Per Dr. Ureña, the patient is to wean off of topiramate and will discontinue Botox. She is to start Ajovy 225mg once monthly for migraine prophylaxis and she received her first dose today in office.   Related Plans, Therapy Recommendations, or Therapy Problems to Be Addressed: Will continue to ensure the patient has an adequate supply of Zavzpret on hand and will provide samples as needed.  She is hoping for a preventative therapy that will reduce her overall migraine severity when she has an episode as well as cut down her migraine days to no more than two per week. I advised the patient that it may take several injections to see the full clinical effects of Ajovy.   Pharmacist to perform regular reassessments no more than (6) months from the previous assessment.  Care Coordinator to set up future refill outreaches, coordinate prescription delivery, and escalate clinical questions to pharmacist.   Welcome information and patient satisfaction survey to be sent by specialty pharmacy team with patient's initial fill.    Attestation  Therapeutic appropriateness: Appropriate   I attest the  patient was actively involved in and has agreed to the above plan of care. If the prescribed therapy is at any point deemed not appropriate based on the current or future assessments, a consultation will be initiated with the patient's specialty care provider to determine the best course of action. The revised plan of therapy will be documented along with any additional patient education provided. Discussed aforementioned material with patient in person.    Jason Flores, PharmD  Clinic Specialty Pharmacist, Neurology  5/24/2024  08:57 EDT

## 2024-05-29 ENCOUNTER — TELEPHONE (OUTPATIENT)
Dept: NEUROLOGY | Facility: CLINIC | Age: 54
End: 2024-05-29
Payer: COMMERCIAL

## 2024-05-29 NOTE — TELEPHONE ENCOUNTER
[facility] called to set up delivery for botox. I informed them this therapy was discontinued. They will call if needing any additional info.

## 2024-05-30 ENCOUNTER — SPECIALTY PHARMACY (OUTPATIENT)
Dept: INFUSION THERAPY | Facility: HOSPITAL | Age: 54
End: 2024-05-30
Payer: COMMERCIAL

## 2024-05-31 ENCOUNTER — SPECIALTY PHARMACY (OUTPATIENT)
Dept: INFUSION THERAPY | Facility: HOSPITAL | Age: 54
End: 2024-05-31
Payer: COMMERCIAL

## 2024-05-31 RX ORDER — FREMANEZUMAB-VFRM 225 MG/1.5ML
225 INJECTION SUBCUTANEOUS
Qty: 1.5 ML | Refills: 5 | Status: SHIPPED | OUTPATIENT
Start: 2024-05-31

## 2024-05-31 NOTE — PROGRESS NOTES
Specialty Pharmacy Refill Coordination Note     Agapito approved until 11/30/24      Caro Kinney  Specialty Pharmacy Technician

## 2024-06-12 ENCOUNTER — SPECIALTY PHARMACY (OUTPATIENT)
Dept: INFUSION THERAPY | Facility: HOSPITAL | Age: 54
End: 2024-06-12
Payer: COMMERCIAL

## 2024-06-25 RX ORDER — RIMEGEPANT SULFATE 75 MG/75MG
TABLET, ORALLY DISINTEGRATING ORAL
Qty: 8 TABLET | Refills: 0 | OUTPATIENT
Start: 2024-06-25

## 2024-06-27 ENCOUNTER — SPECIALTY PHARMACY (OUTPATIENT)
Dept: INFUSION THERAPY | Facility: HOSPITAL | Age: 54
End: 2024-06-27
Payer: COMMERCIAL

## 2024-06-27 RX ORDER — ZAVEGEPANT 10 MG/.1ML
1 SPRAY NASAL ONCE AS NEEDED
Qty: 6 EACH | Refills: 5 | Status: SHIPPED | OUTPATIENT
Start: 2024-06-27

## 2024-08-19 ENCOUNTER — SPECIALTY PHARMACY (OUTPATIENT)
Dept: INFUSION THERAPY | Facility: HOSPITAL | Age: 54
End: 2024-08-19
Payer: COMMERCIAL

## 2024-08-20 ENCOUNTER — SPECIALTY PHARMACY (OUTPATIENT)
Dept: INFUSION THERAPY | Facility: HOSPITAL | Age: 54
End: 2024-08-20
Payer: COMMERCIAL

## 2024-08-20 NOTE — PROGRESS NOTES
Specialty Pharmacy Refill Coordination Note     April is a 53 y.o. female contacted today regarding refills of  1 specialty medication(s).    Reviewed and verified with patient:       Specialty medication(s) and dose(s) confirmed: yes    Refill Questions      Flowsheet Row Most Recent Value   Changes to allergies? No   Changes to medications? No   New conditions or infections since last clinic visit No   Unplanned office visit, urgent care, ED, or hospital admission in the last 4 weeks  No   How does patient/caregiver feel medication is working? Very good   Financial problems or insurance changes  No   Since the previous refill, were any specialty medication doses or scheduled injections missed or delayed?  No   Does this patient require a clinical escalation to a pharmacist? No            Delivery Questions      Flowsheet Row Most Recent Value   Delivery method  at Pharmacy   Number of medications in delivery 1   Medication(s) being filled and delivered Zavegepant Hcl   Doses left of specialty medications 1   Copay verified? Yes   Copay amount $0   Copay form of payment No copayment ($0)                   Follow-up: 25 day(s)     Caro Kinney  Specialty Pharmacy Technician

## 2024-08-23 ENCOUNTER — OFFICE VISIT (OUTPATIENT)
Dept: NEUROLOGY | Facility: CLINIC | Age: 54
End: 2024-08-23
Payer: COMMERCIAL

## 2024-08-23 VITALS
WEIGHT: 109.4 LBS | BODY MASS INDEX: 20.01 KG/M2 | OXYGEN SATURATION: 98 % | SYSTOLIC BLOOD PRESSURE: 110 MMHG | DIASTOLIC BLOOD PRESSURE: 70 MMHG | HEART RATE: 73 BPM

## 2024-08-23 DIAGNOSIS — G43.919 INTRACTABLE MIGRAINE WITHOUT STATUS MIGRAINOSUS, UNSPECIFIED MIGRAINE TYPE: Primary | ICD-10-CM

## 2024-08-23 PROCEDURE — 99214 OFFICE O/P EST MOD 30 MIN: CPT | Performed by: PSYCHIATRY & NEUROLOGY

## 2024-08-23 NOTE — PROGRESS NOTES
Notes by MA:  Pt is here today for a follow up for migraine.      Subjective:     Patient ID: April Tyron is a 53 y.o. female.    Migraine    The following portions of the patient's history were reviewed and updated as appropriate: allergies, current medications, past family history, past medical history, past social history, past surgical history, and problem list.    Review of Systems   Neurological:  Positive for headaches.        Objective:    Neurologic Exam  Awake alert pleasant cooperative oriented in all spheres with fluent speech and normal speech comprehension.    Cranial nerves II through XII normal and symmetric.    Motor exam reveals normal symmetric tone bulk and power throughout without drift or spasticity.  No adventitious movements.    The sensory exam reveals normal and symmetric sensation to light touch, temperature, vibration throughout.    Coordination testing reveals smooth and accurate finger-nose-finger bilaterally, normal heel-to-shin bilaterally and normal rhythmic rapid alternating movements.  Romberg testing is negative.    Tendon reflexes are 2+ and symmetric throughout including preserved ankle jerks bilaterally.  No ankle clonus.    Physical Exam    Assessment/Plan:     Diagnoses and all orders for this visit:    1. Intractable migraine without status migrainosus, unspecified migraine type (Primary)  -     MRI Brain Without Contrast; Future     She was starting to have tachyphylaxis with Botox.  We switched her over to Ajovy at our last visit and she has done quite well with that with significant reduction in headache frequency.  Her headaches can still be quite severe but are responding to Zavzpret as needed.  She is having awakening headaches now, which will require repeat brain imaging to exclude any secondary causes.  If negative, I suspect that her caffeine overuse is resulting in caffeine withdrawal through the night.  She does not have obstructive apnea as far as we know.  We  talked about techniques for gradual caffeine reduction.  Otherwise we will see her back in 6 months.

## 2024-09-10 ENCOUNTER — SPECIALTY PHARMACY (OUTPATIENT)
Dept: INFUSION THERAPY | Facility: HOSPITAL | Age: 54
End: 2024-09-10
Payer: COMMERCIAL

## 2024-09-10 RX ORDER — ZAVEGEPANT 10 MG/.1ML
10 SPRAY NASAL ONCE AS NEEDED
Qty: 6 EACH | Refills: 5 | Status: SHIPPED | OUTPATIENT
Start: 2024-09-10

## 2024-09-18 ENCOUNTER — SPECIALTY PHARMACY (OUTPATIENT)
Dept: INFUSION THERAPY | Facility: HOSPITAL | Age: 54
End: 2024-09-18
Payer: COMMERCIAL

## 2024-09-27 ENCOUNTER — TELEPHONE (OUTPATIENT)
Dept: NEUROLOGY | Facility: CLINIC | Age: 54
End: 2024-09-27
Payer: COMMERCIAL

## 2024-11-18 DIAGNOSIS — G43.919 INTRACTABLE MIGRAINE WITHOUT STATUS MIGRAINOSUS, UNSPECIFIED MIGRAINE TYPE: Primary | ICD-10-CM

## 2024-11-18 RX ORDER — FREMANEZUMAB-VFRM 225 MG/1.5ML
INJECTION SUBCUTANEOUS
Qty: 1.5 ML | Refills: 2 | Status: SHIPPED | OUTPATIENT
Start: 2024-11-18

## 2024-11-29 ENCOUNTER — SPECIALTY PHARMACY (OUTPATIENT)
Dept: INFUSION THERAPY | Facility: HOSPITAL | Age: 54
End: 2024-11-29
Payer: COMMERCIAL

## 2024-11-29 NOTE — PROGRESS NOTES
Specialty Pharmacy Refill Coordination Note     KACEY /1.5 is approved through 11/29/2025     Caro Kinney  Specialty Pharmacy Technician

## 2024-11-29 NOTE — PROGRESS NOTES
Specialty Pharmacy Refill Coordination Note     Agapito PETER renewal submitted 11/29/24 key [id number]     Caro Kinney  Specialty Pharmacy Technician

## 2024-12-02 ENCOUNTER — SPECIALTY PHARMACY (OUTPATIENT)
Dept: INFUSION THERAPY | Facility: HOSPITAL | Age: 54
End: 2024-12-02
Payer: COMMERCIAL

## 2025-02-18 DIAGNOSIS — G43.919 INTRACTABLE MIGRAINE WITHOUT STATUS MIGRAINOSUS, UNSPECIFIED MIGRAINE TYPE: ICD-10-CM

## 2025-02-18 RX ORDER — FREMANEZUMAB-VFRM 225 MG/1.5ML
INJECTION SUBCUTANEOUS
Qty: 1.5 ML | Refills: 0 | Status: SHIPPED | OUTPATIENT
Start: 2025-02-18

## 2025-03-05 ENCOUNTER — SPECIALTY PHARMACY (OUTPATIENT)
Dept: INFUSION THERAPY | Facility: HOSPITAL | Age: 55
End: 2025-03-05
Payer: COMMERCIAL

## 2025-03-05 NOTE — PROGRESS NOTES
Specialty Pharmacy Patient Management Program  Refill Outreach     Zavzpret PA renewal submitted 3.5.25 key [id number]      Caro Kinney  3/5/2025  13:04 EST

## 2025-03-06 ENCOUNTER — SPECIALTY PHARMACY (OUTPATIENT)
Dept: INFUSION THERAPY | Facility: HOSPITAL | Age: 55
End: 2025-03-06
Payer: COMMERCIAL

## 2025-03-06 NOTE — PROGRESS NOTES
Specialty Pharmacy Patient Management Program  Refill Outreach     Zavzpret approved until 3/15/2026 key [id number]      Caro Kinney  3/6/2025  09:01 EST

## 2025-03-07 ENCOUNTER — OFFICE VISIT (OUTPATIENT)
Dept: NEUROLOGY | Facility: CLINIC | Age: 55
End: 2025-03-07
Payer: COMMERCIAL

## 2025-03-07 VITALS
WEIGHT: 126 LBS | BODY MASS INDEX: 23.05 KG/M2 | OXYGEN SATURATION: 97 % | SYSTOLIC BLOOD PRESSURE: 128 MMHG | HEART RATE: 105 BPM | DIASTOLIC BLOOD PRESSURE: 80 MMHG

## 2025-03-07 DIAGNOSIS — G43.719 INTRACTABLE CHRONIC MIGRAINE WITHOUT AURA AND WITHOUT STATUS MIGRAINOSUS: Primary | ICD-10-CM

## 2025-03-07 PROCEDURE — 99214 OFFICE O/P EST MOD 30 MIN: CPT | Performed by: PSYCHIATRY & NEUROLOGY

## 2025-03-07 NOTE — PROGRESS NOTES
Notes by [facility]:  Ms Ackerman is here today for a follow  up for migraine.      Subjective:     Patient ID: April Tyron is a 54 y.o. female.    Migraine    The following portions of the patient's history were reviewed and updated as appropriate: allergies, current medications, past family history, past medical history, past social history, past surgical history, and problem list.    Review of Systems   Neurological:  Positive for headaches.        Objective:    Neurological Exam   Awake alert cooperative with fluent speech and normal speech comprehension.  Cranial nerve II through XII normal and symmetric.  No new motor findings or lateralization.  Sensory examination is stable and symmetric.  Tendon reflexes are stable and symmetric.  Physical Exam    Assessment/Plan:     Diagnoses and all orders for this visit:    1. Intractable chronic migraine without aura and without status migrainosus (Primary)     Because of tachyphylaxis, we had changed her Botox over to Ajovy prophylaxis.  This works well for her but only about 3 weeks before it starts to wear off.  I have given her samples of Qulipta to add in during that last week and we will see how this works for her.  Depending on results, it may be in her best interest to transition her Ajovy over to daily Qulipta.  She is responding very nicely to Zavzpret and I gave her instructions again today on how to appropriately use it to avoid oral/esophageal drainage.

## 2025-03-16 DIAGNOSIS — G43.919 INTRACTABLE MIGRAINE WITHOUT STATUS MIGRAINOSUS, UNSPECIFIED MIGRAINE TYPE: Primary | ICD-10-CM

## 2025-03-17 RX ORDER — ZAVEGEPANT 10 MG/.1ML
SPRAY NASAL
Qty: 6 EACH | Refills: 0 | Status: SHIPPED | OUTPATIENT
Start: 2025-03-17

## 2025-03-28 ENCOUNTER — SPECIALTY PHARMACY (OUTPATIENT)
Dept: INFUSION THERAPY | Facility: HOSPITAL | Age: 55
End: 2025-03-28
Payer: COMMERCIAL

## 2025-03-28 DIAGNOSIS — G43.919 INTRACTABLE MIGRAINE WITHOUT STATUS MIGRAINOSUS, UNSPECIFIED MIGRAINE TYPE: ICD-10-CM

## 2025-03-28 RX ORDER — FREMANEZUMAB-VFRM 225 MG/1.5ML
225 INJECTION SUBCUTANEOUS
Qty: 4.5 ML | Refills: 1 | Status: SHIPPED | OUTPATIENT
Start: 2025-03-28

## 2025-03-28 RX ORDER — FREMANEZUMAB-VFRM 225 MG/1.5ML
INJECTION SUBCUTANEOUS
Qty: 2 ML | Refills: 3 | Status: SHIPPED | OUTPATIENT
Start: 2025-03-28 | End: 2025-03-28 | Stop reason: SDUPTHER

## 2025-05-10 DIAGNOSIS — G43.919 INTRACTABLE MIGRAINE WITHOUT STATUS MIGRAINOSUS, UNSPECIFIED MIGRAINE TYPE: ICD-10-CM

## 2025-05-12 RX ORDER — ZAVEGEPANT 10 MG/.1ML
SPRAY NASAL
Qty: 6 EACH | Refills: 2 | Status: SHIPPED | OUTPATIENT
Start: 2025-05-12

## 2025-05-22 ENCOUNTER — SPECIALTY PHARMACY (OUTPATIENT)
Dept: INFUSION THERAPY | Facility: HOSPITAL | Age: 55
End: 2025-05-22
Payer: COMMERCIAL